# Patient Record
Sex: FEMALE
[De-identification: names, ages, dates, MRNs, and addresses within clinical notes are randomized per-mention and may not be internally consistent; named-entity substitution may affect disease eponyms.]

---

## 2022-09-07 ENCOUNTER — APPOINTMENT (OUTPATIENT)
Dept: PEDIATRIC ALLERGY IMMUNOLOGY | Facility: CLINIC | Age: 47
End: 2022-09-07

## 2022-09-07 DIAGNOSIS — J30.89 OTHER ALLERGIC RHINITIS: ICD-10-CM

## 2022-09-07 DIAGNOSIS — T78.1XXA OTHER ADVERSE FOOD REACTIONS, NOT ELSEWHERE CLASSIFIED, INITIAL ENCOUNTER: ICD-10-CM

## 2022-09-07 DIAGNOSIS — Z87.09 PERSONAL HISTORY OF OTHER DISEASES OF THE RESPIRATORY SYSTEM: ICD-10-CM

## 2022-09-07 DIAGNOSIS — Z84.0 FAMILY HISTORY OF DISEASES OF THE SKIN AND SUBCUTANEOUS TISSUE: ICD-10-CM

## 2022-09-07 PROCEDURE — 99203 OFFICE O/P NEW LOW 30 MIN: CPT | Mod: 25

## 2022-09-07 PROCEDURE — 95004 PERQ TESTS W/ALRGNC XTRCS: CPT

## 2022-09-07 NOTE — ASSESSMENT
[FreeTextEntry1] : 47 y.o female with hx of multiple food allergies and asthma has a child which since outgrown presents with new food reactions and mild nasal allergy symptoms\par \par Skin test today shows: positive to DM DF/DP\par All foods negative including: apple, orange, peach, pineapple, lemon, and tomato\par \par Reactions patient is experiencing with different foods is atypical of IgE mediated food allergies. \par \par No testing available for food dyes but again reaction is atypical of IgE mediated food allergies\par \par Patient to continue to keep detailed food diary and compare ingredients to foods she CAN tolerate\par \par DM precautions discussed and can use antihistamine PRN for any nasal symptoms\par \par

## 2022-09-07 NOTE — PHYSICAL EXAM
[Alert] : alert [Well Nourished] : well nourished [Healthy Appearance] : healthy appearance [No Acute Distress] : no acute distress [Well Developed] : well developed [Normal Voice/Communication] : normal voice communication [Normal Pupil & Iris Size/Symmetry] : normal pupil and iris size and symmetry [Sclera Not Icteric] : sclera not icteric [Normal TMs] : both tympanic membranes were normal [Normal Nasal Mucosa] : the nasal mucosa was normal [Normal Lips/Tongue] : the lips and tongue were normal [Normal Outer Ear/Nose] : the ears and nose were normal in appearance [No Nasal Discharge] : no nasal discharge [Normal Tonsils] : normal tonsils [No Thrush] : no thrush [No Neck Mass] : no neck mass was observed [Normal Rate and Effort] : normal respiratory rhythm and effort [Bilateral Audible Breath Sounds] : bilateral audible breath sounds [Normal Rate] : heart rate was normal  [Normal Cervical Lymph Nodes] : cervical [Skin Intact] : skin intact  [No Rash] : no rash [Normal Mood] : mood was normal [Normal Affect] : affect was normal [Judgment and Insight Age Appropriate] : judgement and insight is age appropriate [Alert, Awake, Oriented as Age-Appropriate] : alert, awake, oriented as age appropriate

## 2022-09-07 NOTE — SOCIAL HISTORY
[House] : [unfilled] lives in a house  [Central Forced Air] : heating provided by central forced air [Central] : air conditioning provided by central unit [Humidifier] : uses a humidifier [Dog] : dog [Bedroom] : not in the bedroom [Basement] : not in the basement [Living Area] : not in the living area [Smokers in Household] : there are no smokers in the home [de-identified] : 2 dogs [de-identified] : exposed to second hand smoke as a child

## 2022-09-07 NOTE — HISTORY OF PRESENT ILLNESS
[Eczematous rashes] : eczematous rashes [de-identified] : 47 y.o female presents for evaluation. Patient claims as a child she was asked to avoid many foods including ketchup and eggs as they would cause her to break out in hives. By age 6 she claims she outgrew her food allergies and was able to eat all these foods again. Over the past few years she's noticed some reactions to foods again.\par \par With Hawaiian punch which contains: apricot, papaya, guava, apple, passion fruit, pineapple, and orange along with Red 40, blue 1, and lisa gum she experienced a red, raised rash within a few hours of ingestions that takes days to disappear. She also had the same rash with same time frame that lasted days with nicholas lemonade which contains: nicholas and lemon juice. Fresh/cooked tomato and ketchup cause exact same symptoms in same time frame that last days. She has no photos of the rash she typically gets\par \par Patient also has mild environmental allergies in the spring and normally takes nothing for it. She also wants to get tested for allergies to her dogs. Overall she has minimal nasal symptoms year long. She also had a hx of asthma as a child which she's since outgrown.